# Patient Record
Sex: FEMALE | Race: WHITE | NOT HISPANIC OR LATINO
[De-identification: names, ages, dates, MRNs, and addresses within clinical notes are randomized per-mention and may not be internally consistent; named-entity substitution may affect disease eponyms.]

---

## 2024-08-22 ENCOUNTER — APPOINTMENT (OUTPATIENT)
Dept: ORTHOPEDIC SURGERY | Facility: CLINIC | Age: 24
End: 2024-08-22
Payer: COMMERCIAL

## 2024-08-22 DIAGNOSIS — M77.8 OTHER ENTHESOPATHIES, NOT ELSEWHERE CLASSIFIED: ICD-10-CM

## 2024-08-22 PROBLEM — Z00.00 ENCOUNTER FOR PREVENTIVE HEALTH EXAMINATION: Status: ACTIVE | Noted: 2024-08-22

## 2024-08-22 PROCEDURE — 73130 X-RAY EXAM OF HAND: CPT | Mod: LT

## 2024-08-22 PROCEDURE — 99203 OFFICE O/P NEW LOW 30 MIN: CPT

## 2024-08-22 RX ORDER — MELOXICAM 15 MG/1
15 TABLET ORAL
Qty: 14 | Refills: 0 | Status: ACTIVE | COMMUNITY
Start: 2024-08-22 | End: 1900-01-01

## 2024-08-22 NOTE — DISCUSSION/SUMMARY
[de-identified] : Patient has tendinitis/ligament sprains of the third and fourth fingers.  At this time I explained this may be due to a sprain that patient had 3 to 4 weeks ago that has not improved because she has not been resting her properly.  I placed patient third fourth and fifth fingers and buddy tape, prescribing meloxicam 15 mg to take as needed for the next 7 to 10 days with a full meal, and I am encouraging patient do warm water and Epsom salt soaks.  Patient should rest/modify activity for at least the next 2 weeks to allow the fingers time to heal.  Patient will follow-up in approximately a month if pain still persistent.  Patient agrees above plan all questions were answered today

## 2024-08-22 NOTE — PHYSICAL EXAM
[de-identified] : Physical exam of the left hand: -No erythema, edema, ecchymosis or obvious deformities noted.  Skin intact -TTP over the ring and middle fingers diffusely, mild TTP 3rd metacarpal head -Patient has full range of motion at all joints of the finger, pain with flexion at MCP joints for 3 and 4th fingers  -+2 radial pulse, sensation intact to light touch

## 2024-08-22 NOTE — DATA REVIEWED
[FreeTextEntry1] : X-ray images were obtained at the office today.  Images of the left hand reveal no acute fractures, dislocations, bony abnormalities

## 2024-08-22 NOTE — HISTORY OF PRESENT ILLNESS
[de-identified] : 23-year-old female presents for left hand pain.  This pain started approximately 2 to 3 weeks ago atraumatically.  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  At this point anything patient does with her left hand bothers her.  Patient states the pain is mostly in her third and fourth fingers.  Patient is right-hand dominant.

## 2024-09-19 ENCOUNTER — APPOINTMENT (OUTPATIENT)
Dept: ORTHOPEDIC SURGERY | Facility: CLINIC | Age: 24
End: 2024-09-19